# Patient Record
Sex: FEMALE | Race: BLACK OR AFRICAN AMERICAN | NOT HISPANIC OR LATINO | URBAN - METROPOLITAN AREA
[De-identification: names, ages, dates, MRNs, and addresses within clinical notes are randomized per-mention and may not be internally consistent; named-entity substitution may affect disease eponyms.]

---

## 2019-08-15 ENCOUNTER — TELEPHONE (OUTPATIENT)
Dept: HEMATOLOGY/ONCOLOGY | Facility: CLINIC | Age: 19
End: 2019-08-15

## 2019-08-28 ENCOUNTER — INITIAL CONSULT (OUTPATIENT)
Dept: HEMATOLOGY/ONCOLOGY | Facility: CLINIC | Age: 19
End: 2019-08-28
Payer: MEDICAID

## 2019-08-28 VITALS
DIASTOLIC BLOOD PRESSURE: 72 MMHG | OXYGEN SATURATION: 98 % | RESPIRATION RATE: 18 BRPM | BODY MASS INDEX: 35.08 KG/M2 | HEIGHT: 63 IN | TEMPERATURE: 99 F | WEIGHT: 198 LBS | HEART RATE: 110 BPM | SYSTOLIC BLOOD PRESSURE: 129 MMHG

## 2019-08-28 DIAGNOSIS — R23.3 EASY BRUISABILITY: ICD-10-CM

## 2019-08-28 DIAGNOSIS — R53.82 CHRONIC FATIGUE: ICD-10-CM

## 2019-08-28 DIAGNOSIS — D50.0 IRON DEFICIENCY ANEMIA DUE TO CHRONIC BLOOD LOSS: ICD-10-CM

## 2019-08-28 DIAGNOSIS — N92.0 MENORRHAGIA WITH REGULAR CYCLE: ICD-10-CM

## 2019-08-28 PROCEDURE — 99999 PR PBB SHADOW E&M-EST. PATIENT-LVL III: ICD-10-PCS | Mod: PBBFAC,,, | Performed by: INTERNAL MEDICINE

## 2019-08-28 PROCEDURE — 99205 PR OFFICE/OUTPT VISIT, NEW, LEVL V, 60-74 MIN: ICD-10-PCS | Mod: S$PBB,,, | Performed by: INTERNAL MEDICINE

## 2019-08-28 PROCEDURE — 99999 PR PBB SHADOW E&M-EST. PATIENT-LVL III: CPT | Mod: PBBFAC,,, | Performed by: INTERNAL MEDICINE

## 2019-08-28 PROCEDURE — 99205 OFFICE O/P NEW HI 60 MIN: CPT | Mod: S$PBB,,, | Performed by: INTERNAL MEDICINE

## 2019-08-28 PROCEDURE — 99213 OFFICE O/P EST LOW 20 MIN: CPT | Mod: PBBFAC | Performed by: INTERNAL MEDICINE

## 2019-08-28 RX ORDER — OMEPRAZOLE 20 MG/1
CAPSULE, DELAYED RELEASE ORAL
Refills: 5 | COMMUNITY
Start: 2019-08-08

## 2019-08-28 RX ORDER — DESONIDE 0.5 MG/ML
LOTION TOPICAL
COMMUNITY
Start: 2019-06-20 | End: 2019-12-17

## 2019-08-28 RX ORDER — TIZANIDINE 4 MG/1
TABLET ORAL
Refills: 0 | COMMUNITY
Start: 2019-08-08

## 2019-08-28 RX ORDER — DESONIDE 0.5 MG/ML
LOTION TOPICAL
Refills: 1 | COMMUNITY
Start: 2019-07-15

## 2019-08-28 RX ORDER — EPINEPHRINE 0.3 MG/.3ML
INJECTION SUBCUTANEOUS
Refills: 1 | COMMUNITY
Start: 2019-07-12

## 2019-08-28 NOTE — Clinical Note
Ptt, inr, fibrinogen facvtor 8 activity, cbc, type and screen, tsh, free T4, iron, tibc, ferritin, retic, von carolina brand antigen, von wilebrand activity in 2 wks f/u a wk alter

## 2019-08-28 NOTE — LETTER
August 28, 2019      [facility]  [address]  [address]  [place] [address]           [facility]  [address]  [place] [address]  Phone: [phone]          Patient: Michelle Perez   MR Number: [id number]   YOB: 2000   Date of Visit: 8/28/2019       Dear [facility]:    Thank you for referring Michelle Perez to me for evaluation. Attached you will find relevant portions of my assessment and plan of care.    If you have questions, please do not hesitate to call me. I look forward to following Michelle Perez along with you.    Sincerely,    Hortencia Moran MD    Enclosure  CC:  No Recipients    If you would like to receive this communication electronically, please contact [email or web address] or [phone] to request more information on [facility] [facility] access.    For providers and/or their staff who would like to refer a patient to [facility], please contact us through our one-stop-shop provider referral line, Zeferino Treviño, at [phone].    If you feel you have received this communication in error or would no longer like to receive these types of communications, please e-mail [email or web address]

## 2019-08-28 NOTE — PROGRESS NOTES
CC: Abnormal bleeding      is a 20y/o lady with anemia, here for hematology consultation. She has suspected von willebrand disease. She has menorrhagia.her symptoms started 2-3 years ago. She has  n other bleeding events. Denies epistaxis, gum bleeding, hemoptysis, upper or lower GI bleeding, hematuria, bleeding into her joints.  She had dental extraction last year and it was uneventful. No h/o major surgeries.   She has no family history of bleeding diathesis. She has never been pregnant. She has easy bruising. She has never required blood products.   She has been on oral birthcontrol. Her uterine bleeding has decreased, but still continues to bleed heavily during the week she takes the placebo.         Review of Systems   Constitutional: Positive for malaise/fatigue. Negative for chills, fever and weight loss.   HENT: Negative for congestion, ear discharge, ear pain, nosebleeds and tinnitus.    Eyes: Negative for blurred vision, double vision and photophobia.   Respiratory: Negative for cough and hemoptysis.    Cardiovascular: Negative for chest pain, palpitations, orthopnea and leg swelling.   Gastrointestinal: Negative for blood in stool, diarrhea, heartburn, melena, nausea and vomiting.   Genitourinary: Negative for dysuria and frequency.   Musculoskeletal: Negative for myalgias and neck pain.   Skin: Negative for rash.   Neurological: Negative for tingling, tremors, sensory change and headaches.   Endo/Heme/Allergies: Negative for environmental allergies. Bruises/bleeds easily.   Psychiatric/Behavioral: Negative for depression, substance abuse and suicidal ideas.     Medical problems:    1. Anemia  2. Menorrhagia    Surgeries:    none    Family History:    Mother has sickle cell anemia. Maternal grandmother has diabetes mellitus, hypertension; mother has essential hypertension; father has essential HTn, diabetes mellitus.        Social History     Socioeconomic History    Marital status: Single      Spouse name: Not on file    Number of children: Not on file    Years of education: Not on file    Highest education level: Not on file   Occupational History    Not on file   Social Needs    Financial resource strain: Not on file    Food insecurity:     Worry: Not on file     Inability: Not on file    Transportation needs:     Medical: Not on file     Non-medical: Not on file   Tobacco Use    Smoking status: Never Smoker   Substance and Sexual Activity    Alcohol use: Not Currently    Drug use: Never    Sexual activity: Not on file   Lifestyle    Physical activity:     Days per week: Not on file     Minutes per session: Not on file    Stress: Not on file   Relationships    Social connections:     Talks on phone: Not on file     Gets together: Not on file     Attends [employer] service: Not on file     Active member of club or organization: Not on file     Attends meetings of clubs or organizations: Not on file     Relationship status: Not on file   Other Topics Concern    Not on file   Social History Narrative    Not on file         Review of patient's allergies indicates:   Allergen Reactions    Fish containing products Swelling       Current Outpatient Medications   Medication Sig    desonide (DESOWEN) 0.05 % lotion Apply topically.    desonide (DESOWEN) 0.05 % lotion MILLY EXT AA BID    EPINEPHrine (EPIPEN) 0.3 mg/0.3 mL AtIn INJECT 0.3ML IM PRF ANAPHYLAXIS. MAY REPEAT IF NEEDED IN 15 MINUTES    OGESTREL, 28, 0.5-50 mg-mcg per tablet TK 1 T PO QD    omeprazole (PRILOSEC) 20 MG capsule TK 1 C PO D    tiZANidine (ZANAFLEX) 4 MG tablet TK 1 T PO Q 8 H PRN     No current facility-administered medications for this visit.          Vitals:    08/28/19 1716   BP: 129/72   Pulse: 110   Resp: 18   Temp: 98.7 °F (37.1 °C)       Physical Exam   Constitutional: She appears well-developed. No distress.   HENT:   Head: Normocephalic and atraumatic.   Mouth/Throat: No oropharyngeal exudate.   Neck: Normal  range of motion.   Cardiovascular: Normal rate and regular rhythm.   Pulmonary/Chest: Effort normal. No respiratory distress. She has no wheezes. She has no rales.   Abdominal: Soft. She exhibits no distension. There is no tenderness. There is no rebound.   Musculoskeletal: She exhibits no edema.   Lymphadenopathy:     She has no cervical adenopathy.   Neurological: She is alert. No cranial nerve deficit.   Skin: Skin is warm.   Psychiatric: She has a normal mood and affect.       Assessment:    1. Easy bruising  2. Menorrhagia  3. Chronic fatigue  4. Iron deficiency anemia      Plan:    1,2,3,4: She had von willebrand antigen and activity checked at outside facility in January 2018.  The antigen and activity, as well as FVIII activity were HIGH. It is unlikely she has von willebrand disase or any bleeding diathesis, as her bleeding history is negative except for menorrhagia. She will have PTT, INR, Fibrinogen, von willebrand panel, FVIII activity, blood type, TFT , CBC, iron, TIBC, ferritin checked in 2 weeks, which is the approximate time of onset of her next menstrual cycle.  She will hold oral birth control for 2 weeks prior to having these tests, as hormonal birth control can affect the accuracy of these tests.

## 2019-09-04 ENCOUNTER — TELEPHONE (OUTPATIENT)
Dept: INFUSION THERAPY | Facility: HOSPITAL | Age: 19
End: 2019-09-04

## 2019-09-04 NOTE — TELEPHONE ENCOUNTER
----- Message from Saray Trevino sent at 9/4/2019  9:38 AM CDT -----  Contact: Pt   Pt called to speak with  to see if pt can go do labs today   Callback#[phone]  Thank You  TOBY Trevino

## 2019-09-05 ENCOUNTER — LAB VISIT (OUTPATIENT)
Dept: LAB | Facility: HOSPITAL | Age: 19
End: 2019-09-05
Attending: INTERNAL MEDICINE
Payer: MEDICAID

## 2019-09-05 DIAGNOSIS — R23.3 EASY BRUISABILITY: ICD-10-CM

## 2019-09-05 DIAGNOSIS — R53.82 CHRONIC FATIGUE: ICD-10-CM

## 2019-09-05 DIAGNOSIS — N92.0 MENORRHAGIA WITH REGULAR CYCLE: ICD-10-CM

## 2019-09-05 DIAGNOSIS — D50.0 IRON DEFICIENCY ANEMIA DUE TO CHRONIC BLOOD LOSS: ICD-10-CM

## 2019-09-05 LAB
ABO + RH BLD: NORMAL
APTT BLDCRRT: 29.7 SEC (ref 21–32)
BASOPHILS # BLD AUTO: 0.06 K/UL (ref 0–0.2)
BASOPHILS NFR BLD: 0.7 % (ref 0–1.9)
BLD GP AB SCN CELLS X3 SERPL QL: NORMAL
DIFFERENTIAL METHOD: ABNORMAL
EOSINOPHIL # BLD AUTO: 0.1 K/UL (ref 0–0.5)
EOSINOPHIL NFR BLD: 1 % (ref 0–8)
ERYTHROCYTE [DISTWIDTH] IN BLOOD BY AUTOMATED COUNT: 15.5 % (ref 11.5–14.5)
FERRITIN SERPL-MCNC: 41 NG/ML (ref 20–300)
FIBRINOGEN PPP-MCNC: 335 MG/DL (ref 182–366)
HCT VFR BLD AUTO: 39.8 % (ref 37–48.5)
HGB BLD-MCNC: 13.1 G/DL (ref 12–16)
IMM GRANULOCYTES # BLD AUTO: 0.02 K/UL (ref 0–0.04)
IMM GRANULOCYTES NFR BLD AUTO: 0.2 % (ref 0–0.5)
INR PPP: 1 (ref 0.8–1.2)
IRON SERPL-MCNC: 39 UG/DL (ref 30–160)
LYMPHOCYTES # BLD AUTO: 4.2 K/UL (ref 1–4.8)
LYMPHOCYTES NFR BLD: 46.1 % (ref 18–48)
MCH RBC QN AUTO: 25.2 PG (ref 27–31)
MCHC RBC AUTO-ENTMCNC: 32.9 G/DL (ref 32–36)
MCV RBC AUTO: 77 FL (ref 82–98)
MONOCYTES # BLD AUTO: 0.6 K/UL (ref 0.3–1)
MONOCYTES NFR BLD: 6.9 % (ref 4–15)
NEUTROPHILS # BLD AUTO: 4.1 K/UL (ref 1.8–7.7)
NEUTROPHILS NFR BLD: 45.1 % (ref 38–73)
NRBC BLD-RTO: 0 /100 WBC
PLATELET # BLD AUTO: 359 K/UL (ref 150–350)
PMV BLD AUTO: 10.4 FL (ref 9.2–12.9)
PROTHROMBIN TIME: 10.7 SEC (ref 9–12.5)
RBC # BLD AUTO: 5.2 M/UL (ref 4–5.4)
RETICS/RBC NFR AUTO: 0.9 % (ref 0.5–2.5)
SATURATED IRON: 8 % (ref 20–50)
T4 FREE SERPL-MCNC: 1.11 NG/DL (ref 0.71–1.51)
TOTAL IRON BINDING CAPACITY: 490 UG/DL (ref 250–450)
TRANSFERRIN SERPL-MCNC: 331 MG/DL (ref 200–375)
TSH SERPL DL<=0.005 MIU/L-ACNC: 0.61 UIU/ML (ref 0.4–4)
WBC # BLD AUTO: 9.14 K/UL (ref 3.9–12.7)

## 2019-09-05 PROCEDURE — 83540 ASSAY OF IRON: CPT

## 2019-09-05 PROCEDURE — 85730 THROMBOPLASTIN TIME PARTIAL: CPT

## 2019-09-05 PROCEDURE — 85240 CLOT FACTOR VIII AHG 1 STAGE: CPT

## 2019-09-05 PROCEDURE — 85610 PROTHROMBIN TIME: CPT

## 2019-09-05 PROCEDURE — 84443 ASSAY THYROID STIM HORMONE: CPT

## 2019-09-05 PROCEDURE — 82728 ASSAY OF FERRITIN: CPT

## 2019-09-05 PROCEDURE — 36415 COLL VENOUS BLD VENIPUNCTURE: CPT | Mod: PO

## 2019-09-05 PROCEDURE — 84439 ASSAY OF FREE THYROXINE: CPT

## 2019-09-05 PROCEDURE — 85246 CLOT FACTOR VIII VW ANTIGEN: CPT

## 2019-09-05 PROCEDURE — 85045 AUTOMATED RETICULOCYTE COUNT: CPT

## 2019-09-05 PROCEDURE — 85397 CLOTTING FUNCT ACTIVITY: CPT

## 2019-09-05 PROCEDURE — 85025 COMPLETE CBC W/AUTO DIFF WBC: CPT

## 2019-09-05 PROCEDURE — 85384 FIBRINOGEN ACTIVITY: CPT

## 2019-09-05 PROCEDURE — 86850 RBC ANTIBODY SCREEN: CPT

## 2019-09-06 LAB — FACT VIII ACT/NOR PPP: 167 % (ref 60–170)

## 2019-09-07 LAB
VWF AG ACT/NOR PPP IA: 154 % (ref 55–200)
VWF:AC ACT/NOR PPP IA: 116 % (ref 55–200)

## 2019-09-11 ENCOUNTER — TELEPHONE (OUTPATIENT)
Dept: HEMATOLOGY/ONCOLOGY | Facility: CLINIC | Age: 19
End: 2019-09-11

## 2019-09-16 ENCOUNTER — TELEPHONE (OUTPATIENT)
Dept: HEMATOLOGY/ONCOLOGY | Facility: CLINIC | Age: 19
End: 2019-09-16

## 2019-09-16 NOTE — TELEPHONE ENCOUNTER
Spoke to patient,.  Rescheduled appt      ----- Message from Rosalie Mackay sent at 9/16/2019  9:36 AM CDT -----  Contact: self  Type:  Reschedule  Appointment Request    Name of Caller:   Patient need appointment reschedule for Monday or Tuesday for orning or early afternoon  When is the first available appointment?  Symptoms:  Would the patient rather a call back or a response via [facility]? call  Best Call Back Number:[phone]  Additional Information:

## 2019-09-23 ENCOUNTER — OFFICE VISIT (OUTPATIENT)
Dept: HEMATOLOGY/ONCOLOGY | Facility: CLINIC | Age: 19
End: 2019-09-23
Payer: MEDICAID

## 2019-09-23 VITALS
BODY MASS INDEX: 35.86 KG/M2 | OXYGEN SATURATION: 94 % | HEART RATE: 89 BPM | SYSTOLIC BLOOD PRESSURE: 125 MMHG | WEIGHT: 202.38 LBS | HEIGHT: 63 IN | RESPIRATION RATE: 18 BRPM | DIASTOLIC BLOOD PRESSURE: 95 MMHG | TEMPERATURE: 99 F

## 2019-09-23 DIAGNOSIS — R53.82 CHRONIC FATIGUE: ICD-10-CM

## 2019-09-23 DIAGNOSIS — N92.0 MENORRHAGIA WITH REGULAR CYCLE: Primary | ICD-10-CM

## 2019-09-23 DIAGNOSIS — E61.1 IRON DEFICIENCY: ICD-10-CM

## 2019-09-23 DIAGNOSIS — R23.3 EASY BRUISABILITY: ICD-10-CM

## 2019-09-23 PROCEDURE — 99214 OFFICE O/P EST MOD 30 MIN: CPT | Mod: S$PBB,,, | Performed by: INTERNAL MEDICINE

## 2019-09-23 PROCEDURE — 99213 OFFICE O/P EST LOW 20 MIN: CPT | Mod: PBBFAC | Performed by: INTERNAL MEDICINE

## 2019-09-23 PROCEDURE — 99999 PR PBB SHADOW E&M-EST. PATIENT-LVL III: CPT | Mod: PBBFAC,,, | Performed by: INTERNAL MEDICINE

## 2019-09-23 PROCEDURE — 99214 PR OFFICE/OUTPT VISIT, EST, LEVL IV, 30-39 MIN: ICD-10-PCS | Mod: S$PBB,,, | Performed by: INTERNAL MEDICINE

## 2019-09-23 PROCEDURE — 99999 PR PBB SHADOW E&M-EST. PATIENT-LVL III: ICD-10-PCS | Mod: PBBFAC,,, | Performed by: INTERNAL MEDICINE

## 2019-09-23 NOTE — PROGRESS NOTES
CC: Abnormal bleeding , follow up      is a 20y/o lady with anemia, here for follow up visit. She has suspected von willebrand disease. She has menorrhagia.her symptoms started 2-3 years ago. She has  n other bleeding events. Denies epistaxis, gum bleeding, hemoptysis, upper or lower GI bleeding, hematuria, bleeding into her joints. She had dental extraction last year and it was uneventful. No h/o major surgeries. She has no family history of bleeding diathesis. She has never been pregnant. She has easy bruising. She has never required blood products.   She has been on oral birthcontrol. Her uterine bleeding has decreased, but still continues to bleed heavily during the week she takes the placebo.       Interval History: She is here for a follow up visit.    Review of Systems   Constitutional: Positive for malaise/fatigue. Negative for chills, fever and weight loss.   HENT: Negative for congestion, ear discharge, nosebleeds and sinus pain.    Eyes: Negative for blurred vision, double vision, photophobia and pain.   Respiratory: Negative for cough, hemoptysis and sputum production.    Cardiovascular: Negative for orthopnea and leg swelling.   Gastrointestinal: Negative for abdominal pain, blood in stool, constipation, diarrhea, heartburn, melena, nausea and vomiting.   Genitourinary: Negative for dysuria, frequency and urgency.   Musculoskeletal: Negative for back pain and myalgias.   Skin: Negative for rash.   Neurological: Negative for dizziness, tingling, sensory change, speech change and headaches.   Endo/Heme/Allergies: Negative for environmental allergies. Bruises/bleeds easily.   Psychiatric/Behavioral: Negative for depression. The patient is not nervous/anxious.        Current Outpatient Medications   Medication Sig    desonide (DESOWEN) 0.05 % lotion Apply topically.    desonide (DESOWEN) 0.05 % lotion MILLY EXT AA BID    EPINEPHrine (EPIPEN) 0.3 mg/0.3 mL AtIn INJECT 0.3ML IM PRF ANAPHYLAXIS. MAY  REPEAT IF NEEDED IN 15 MINUTES    OGESTREL, 28, 0.5-50 mg-mcg per tablet TK 1 T PO QD    omeprazole (PRILOSEC) 20 MG capsule TK 1 C PO D    tiZANidine (ZANAFLEX) 4 MG tablet TK 1 T PO Q 8 H PRN     No current facility-administered medications for this visit.           Vitals:    09/23/19 1359   BP: (!) 125/95   Pulse: 89   Resp: 18   Temp: 99.1 °F (37.3 °C)     Physical Exam   Constitutional: She is oriented to person, place, and time. She appears well-developed. No distress.   HENT:   Head: Normocephalic.   Mouth/Throat: No oropharyngeal exudate.   Eyes: No scleral icterus.   Neck: Normal range of motion.   Cardiovascular: Normal rate.   No murmur heard.  Pulmonary/Chest: Effort normal. No respiratory distress. She has no wheezes. She has no rales.   Abdominal: Soft. She exhibits no distension. There is no tenderness. There is no rebound.   Musculoskeletal: She exhibits no edema.   Lymphadenopathy:     She has no cervical adenopathy.   Neurological: She is alert and oriented to person, place, and time. No cranial nerve deficit.   Skin: Skin is warm.   Psychiatric: She has a normal mood and affect.          Component      Latest Ref Rng & Units 9/5/2019   WBC      3.90 - 12.70 K/uL 9.14   RBC      4.00 - 5.40 M/uL 5.20   Hemoglobin      12.0 - 16.0 g/dL 13.1   Hematocrit      37.0 - 48.5 % 39.8   MCV      82 - 98 fL 77 (L)   MCH      27.0 - 31.0 pg 25.2 (L)   MCHC      32.0 - 36.0 g/dL 32.9   RDW      11.5 - 14.5 % 15.5 (H)   Platelets      150 - 350 K/uL 359 (H)   MPV      9.2 - 12.9 fL 10.4   Immature Granulocytes      0.0 - 0.5 % 0.2   Gran # (ANC)      1.8 - 7.7 K/uL 4.1   Immature Grans (Abs)      0.00 - 0.04 K/uL 0.02   Lymph #      1.0 - 4.8 K/uL 4.2   Mono #      0.3 - 1.0 K/uL 0.6   Eos #      0.0 - 0.5 K/uL 0.1   Baso #      0.00 - 0.20 K/uL 0.06   nRBC      0 /100 WBC 0   Gran%      38.0 - 73.0 % 45.1   Lymph%      18.0 - 48.0 % 46.1   Mono%      4.0 - 15.0 % 6.9   Eosinophil%      0.0 - 8.0 % 1.0    Basophil%      0.0 - 1.9 % 0.7   Differential Method       Automated   Iron      30 - 160 ug/dL 39   Transferrin      200 - 375 mg/dL 331   TIBC      250 - 450 ug/dL 490 (H)   Saturated Iron      20 - 50 % 8 (L)   Protime      9.0 - 12.5 sec 10.7   Coumadin Monitoring INR      0.8 - 1.2 1.0   aPTT      21.0 - 32.0 sec 29.7   Fibrinogen      182 - 366 mg/dL 335   TSH      0.400 - 4.000 uIU/mL 0.608   Free T4      0.71 - 1.51 ng/dL 1.11   Von Willebrand Ag      55 - 200 % 154   Retic      0.5 - 2.5 % 0.9   Von Willebrand Factor Activity      55 - 200 % 116   Ferritin      20.0 - 300.0 ng/mL 41       Assessment:     1. Easy bruising  2. Menorrhagia  3. Chronic fatigue  4. Iron deficiency anemia        Plan:     1,2,3,4: She had von willebrand antigen and activity checked at outside facility in January 2018.  The antigen and activity, as well as FVIII activity were HIGH. It is unlikely she has von willebrand disase or any bleeding diathesis, as her bleeding history is negative except for menorrhagia.PTT, INR, Fibrinogen all normal on 9/5/19. von willebrand Ag was 154%, activity 116%, RATIO 0.75. She was not on birth control at the time of testing. Although there is antigen: activity mismatch, the ratio is 0.75, making type 2 vWD unlikely.    FVIII activity was normal. TFT was normal. It was done at the beginning of her menstrual cycle. She is A positive.    CBC showed normal hemoglobin but with low MCV. Serum iron saturation was low at 8%. Ferritin was normal at 41ng/ml.  Recommend PO iron on empty stomach.  She will follow with her OB for menorrhagia.